# Patient Record
Sex: FEMALE | Race: OTHER | Employment: UNEMPLOYED | URBAN - METROPOLITAN AREA
[De-identification: names, ages, dates, MRNs, and addresses within clinical notes are randomized per-mention and may not be internally consistent; named-entity substitution may affect disease eponyms.]

---

## 2024-11-08 ENCOUNTER — APPOINTMENT (EMERGENCY)
Dept: ULTRASOUND IMAGING | Facility: HOSPITAL | Age: 29
End: 2024-11-08

## 2024-11-08 ENCOUNTER — HOSPITAL ENCOUNTER (EMERGENCY)
Facility: HOSPITAL | Age: 29
Discharge: HOME | End: 2024-11-08
Attending: EMERGENCY MEDICINE

## 2024-11-08 VITALS
HEIGHT: 64 IN | DIASTOLIC BLOOD PRESSURE: 68 MMHG | WEIGHT: 146 LBS | TEMPERATURE: 98.5 F | SYSTOLIC BLOOD PRESSURE: 106 MMHG | BODY MASS INDEX: 24.92 KG/M2 | OXYGEN SATURATION: 100 % | RESPIRATION RATE: 18 BRPM | HEART RATE: 60 BPM

## 2024-11-08 DIAGNOSIS — N83.8 ENLARGED OVARY: Primary | ICD-10-CM

## 2024-11-08 DIAGNOSIS — R10.2 PELVIC PAIN IN FEMALE: ICD-10-CM

## 2024-11-08 LAB
ALBUMIN SERPL BCG-MCNC: 4.2 G/DL (ref 3.5–5)
ALP SERPL-CCNC: 49 U/L (ref 34–104)
ALT SERPL W P-5'-P-CCNC: 14 U/L (ref 7–52)
ANION GAP SERPL CALCULATED.3IONS-SCNC: 6 MMOL/L (ref 4–13)
AST SERPL W P-5'-P-CCNC: 15 U/L (ref 13–39)
BASOPHILS # BLD AUTO: 0.03 THOUSANDS/ÂΜL (ref 0–0.1)
BASOPHILS NFR BLD AUTO: 0 % (ref 0–1)
BILIRUB SERPL-MCNC: 0.21 MG/DL (ref 0.2–1)
BUN SERPL-MCNC: 30 MG/DL (ref 5–25)
CALCIUM SERPL-MCNC: 9.2 MG/DL (ref 8.4–10.2)
CHLORIDE SERPL-SCNC: 105 MMOL/L (ref 96–108)
CO2 SERPL-SCNC: 26 MMOL/L (ref 21–32)
CREAT SERPL-MCNC: 1.07 MG/DL (ref 0.6–1.3)
EOSINOPHIL # BLD AUTO: 0.09 THOUSAND/ÂΜL (ref 0–0.61)
EOSINOPHIL NFR BLD AUTO: 1 % (ref 0–6)
ERYTHROCYTE [DISTWIDTH] IN BLOOD BY AUTOMATED COUNT: 11.9 % (ref 11.6–15.1)
GFR SERPL CREATININE-BSD FRML MDRD: 69 ML/MIN/1.73SQ M
GLUCOSE SERPL-MCNC: 84 MG/DL (ref 65–140)
HCG SERPL QL: NEGATIVE
HCT VFR BLD AUTO: 37.9 % (ref 34.8–46.1)
HGB BLD-MCNC: 12.5 G/DL (ref 11.5–15.4)
IMM GRANULOCYTES # BLD AUTO: 0.01 THOUSAND/UL (ref 0–0.2)
IMM GRANULOCYTES NFR BLD AUTO: 0 % (ref 0–2)
LYMPHOCYTES # BLD AUTO: 3.03 THOUSANDS/ÂΜL (ref 0.6–4.47)
LYMPHOCYTES NFR BLD AUTO: 43 % (ref 14–44)
MCH RBC QN AUTO: 30.6 PG (ref 26.8–34.3)
MCHC RBC AUTO-ENTMCNC: 33 G/DL (ref 31.4–37.4)
MCV RBC AUTO: 93 FL (ref 82–98)
MONOCYTES # BLD AUTO: 0.53 THOUSAND/ÂΜL (ref 0.17–1.22)
MONOCYTES NFR BLD AUTO: 8 % (ref 4–12)
NEUTROPHILS # BLD AUTO: 3.3 THOUSANDS/ÂΜL (ref 1.85–7.62)
NEUTS SEG NFR BLD AUTO: 48 % (ref 43–75)
NRBC BLD AUTO-RTO: 0 /100 WBCS
PLATELET # BLD AUTO: 264 THOUSANDS/UL (ref 149–390)
PMV BLD AUTO: 9.8 FL (ref 8.9–12.7)
POTASSIUM SERPL-SCNC: 4.2 MMOL/L (ref 3.5–5.3)
PROT SERPL-MCNC: 6.4 G/DL (ref 6.4–8.4)
RBC # BLD AUTO: 4.08 MILLION/UL (ref 3.81–5.12)
SODIUM SERPL-SCNC: 137 MMOL/L (ref 135–147)
WBC # BLD AUTO: 6.99 THOUSAND/UL (ref 4.31–10.16)

## 2024-11-08 PROCEDURE — 80053 COMPREHEN METABOLIC PANEL: CPT | Performed by: EMERGENCY MEDICINE

## 2024-11-08 PROCEDURE — 96374 THER/PROPH/DIAG INJ IV PUSH: CPT

## 2024-11-08 PROCEDURE — 99284 EMERGENCY DEPT VISIT MOD MDM: CPT | Performed by: EMERGENCY MEDICINE

## 2024-11-08 PROCEDURE — 76830 TRANSVAGINAL US NON-OB: CPT

## 2024-11-08 PROCEDURE — 85025 COMPLETE CBC W/AUTO DIFF WBC: CPT | Performed by: EMERGENCY MEDICINE

## 2024-11-08 PROCEDURE — 84703 CHORIONIC GONADOTROPIN ASSAY: CPT | Performed by: EMERGENCY MEDICINE

## 2024-11-08 PROCEDURE — 96375 TX/PRO/DX INJ NEW DRUG ADDON: CPT

## 2024-11-08 PROCEDURE — 99284 EMERGENCY DEPT VISIT MOD MDM: CPT

## 2024-11-08 PROCEDURE — 36415 COLL VENOUS BLD VENIPUNCTURE: CPT | Performed by: EMERGENCY MEDICINE

## 2024-11-08 PROCEDURE — 76856 US EXAM PELVIC COMPLETE: CPT

## 2024-11-08 RX ORDER — FENTANYL CITRATE 50 UG/ML
50 INJECTION, SOLUTION INTRAMUSCULAR; INTRAVENOUS ONCE
Status: COMPLETED | OUTPATIENT
Start: 2024-11-08 | End: 2024-11-08

## 2024-11-08 RX ORDER — KETOROLAC TROMETHAMINE 30 MG/ML
15 INJECTION, SOLUTION INTRAMUSCULAR; INTRAVENOUS ONCE
Status: COMPLETED | OUTPATIENT
Start: 2024-11-08 | End: 2024-11-08

## 2024-11-08 RX ADMIN — KETOROLAC TROMETHAMINE 15 MG: 30 INJECTION, SOLUTION INTRAMUSCULAR at 21:49

## 2024-11-08 RX ADMIN — FENTANYL CITRATE 50 MCG: 50 INJECTION INTRAMUSCULAR; INTRAVENOUS at 20:03

## 2024-11-09 NOTE — ED PROVIDER NOTES
Time reflects when diagnosis was documented in both MDM as applicable and the Disposition within this note       Time User Action Codes Description Comment    11/8/2024 10:07 PM Celeste Mary [N83.8] Enlarged ovary     11/8/2024 10:08 PM Celeste Mary [R10.2] Pelvic pain in female           ED Disposition       ED Disposition   Discharge    Condition   Stable    Date/Time   Fri Nov 8, 2024 10:08 PM    Comment   Aleta Griffith discharge to home/self care.                   Assessment & Plan       Medical Decision Making  29-year-old female presents for evaluation with right-sided pelvic pain on and off for the past 1 week.  On exam, patient with normal vitals, no acute distress, but patient does appear uncomfortable.  Patient has some tenderness in the right lower quadrant of the abdomen without rebound tenderness.  Concern for possible ovarian cysts versus ovarian torsion versus ectopic pregnancy versus TOA, or other acute intra-abdominal pathology.  Patient given IV analgesia, evaluated with CBC, CMP, urine preg, and pelvic ultrasound.    Urine pregnancy test negative at this time.  Lab workup overall unremarkable.  Ultrasound of the pelvis showed a heterogenous enlarged right ovary with normal Doppler flow.  Spoke with on-call gynecologist who states that the enlarged ovary may be the cause of patient's symptoms.  She does not suspect intermittent ovarian torsion at this time given that the patient has not had any fevers or vomiting.  Patient was given a referral for OB/GYN for outpatient follow-up.  Patient given symptomatic care instructions, discharged home in stable condition with strict ED return precautions.    Amount and/or Complexity of Data Reviewed  Labs: ordered.  Radiology: ordered.    Risk  Prescription drug management.             Medications   fentaNYL injection 50 mcg (50 mcg Intravenous Given 11/8/24 2003)   ketorolac (TORADOL) injection 15 mg (15 mg Intravenous Given 11/8/24 2149)       ED  Risk Strat Scores                           SBIRT 20yo+      Flowsheet Row Most Recent Value   Initial Alcohol Screen: US AUDIT-C     1. How often do you have a drink containing alcohol? 0 Filed at: 11/08/2024 1923   2. How many drinks containing alcohol do you have on a typical day you are drinking?  0 Filed at: 11/08/2024 1923   3b. FEMALE Any Age, or MALE 65+: How often do you have 4 or more drinks on one occassion? 0 Filed at: 11/08/2024 1923   Audit-C Score 0 Filed at: 11/08/2024 1923   GEN: How many times in the past year have you...    Used an illegal drug or used a prescription medication for non-medical reasons? Never Filed at: 11/08/2024 1923                            History of Present Illness       Chief Complaint   Patient presents with    Abdominal Pain     Patient co right abdominal pain that started last Thursday. Patient reports hx ovarian cyst. Patient reports she did recently had her IUD removed. + nausea.        Past Medical History:   Diagnosis Date    Ovarian hyperstimulation syndrome       History reviewed. No pertinent surgical history.   History reviewed. No pertinent family history.   Social History     Tobacco Use    Smoking status: Never    Smokeless tobacco: Never   Vaping Use    Vaping status: Never Used   Substance Use Topics    Alcohol use: Not Currently    Drug use: Not Currently      E-Cigarette/Vaping    E-Cigarette Use Never User       E-Cigarette/Vaping Substances      I have reviewed and agree with the history as documented.     29-year-old female presents for evaluation with right sided pelvic pain.  Patient states that pain began approximately 1 week ago, but has been intermittent since then.  She believes that she may have ovarian cysts, and states that she has had several times where it feels as though a cyst has ruptured.  She states that she did have a history of ovarian cysts years ago, but she has not had any issues with them since being on birth control.  However,  approximately 1 month ago, patient had her IUD removed.  She denies any associated fevers.  She states that she has had some nausea, but denies any episodes of vomiting.  Patient states that she is still eating normally.  She denies any vaginal bleeding or discharge.  Denies any other concerning symptoms at this time.        Review of Systems   Constitutional:  Negative for fever.   Cardiovascular:  Negative for chest pain.   Gastrointestinal:  Positive for abdominal pain. Negative for vomiting.   Genitourinary:  Positive for pelvic pain. Negative for vaginal bleeding and vaginal discharge.   All other systems reviewed and are negative.          Objective       ED Triage Vitals   Temperature Pulse Blood Pressure Respirations SpO2 Patient Position - Orthostatic VS   11/08/24 1920 11/08/24 1920 11/08/24 1920 11/08/24 1920 11/08/24 1920 11/08/24 1920   98.5 °F (36.9 °C) 74 121/72 15 100 % Sitting      Temp Source Heart Rate Source BP Location FiO2 (%) Pain Score    11/08/24 1920 11/08/24 1920 11/08/24 1920 -- 11/08/24 2003    Oral Monitor Left arm  9      Vitals      Date and Time Temp Pulse SpO2 Resp BP Pain Score FACES Pain Rating User   11/08/24 2215 -- 60 100 % 18 106/68 -- -- [place]   11/08/24 2152 -- 60 99 % 16 89/54 6 -- [place]   11/08/24 2149 -- -- -- -- -- 6 -- [place]   11/08/24 2003 -- -- -- -- -- 9 -- [place]   11/08/24 1920 98.5 °F (36.9 °C) 74 100 % 15 121/72 -- -- [place]            Physical Exam  Vitals and nursing note reviewed.   Constitutional:       General: She is awake. She is not in acute distress.     Appearance: She is not toxic-appearing.   HENT:      Head: Normocephalic and atraumatic.   Eyes:      General: Vision grossly intact. Gaze aligned appropriately.   Cardiovascular:      Rate and Rhythm: Normal rate and regular rhythm.   Pulmonary:      Effort: Pulmonary effort is normal. No respiratory distress.   Abdominal:      General: There is no distension.      Palpations: Abdomen is soft.      Tenderness: There is  abdominal tenderness in the right lower quadrant. There is no rebound.   Musculoskeletal:      Cervical back: Full passive range of motion without pain and neck supple.   Skin:     General: Skin is warm and dry.   Neurological:      General: No focal deficit present.      Mental Status: She is alert and oriented to person, place, and time.         Results Reviewed       Procedure Component Value Units Date/Time    hCG, qualitative pregnancy [[id number]]  (Normal) Collected: 11/08/24 2003    Lab Status: Final result Specimen: Blood from Arm, Right Updated: 11/08/24 2037     Preg, Serum Negative    Comprehensive metabolic panel [[id number]]  (Abnormal) Collected: 11/08/24 2003    Lab Status: Final result Specimen: Blood from Arm, Right Updated: 11/08/24 2031     Sodium 137 mmol/L      Potassium 4.2 mmol/L      Chloride 105 mmol/L      CO2 26 mmol/L      ANION GAP 6 mmol/L      BUN 30 mg/dL      Creatinine 1.07 mg/dL      Glucose 84 mg/dL      Calcium 9.2 mg/dL      AST 15 U/L      ALT 14 U/L      Alkaline Phosphatase 49 U/L      Total Protein 6.4 g/dL      Albumin 4.2 g/dL      Total Bilirubin 0.21 mg/dL      eGFR 69 ml/min/1.73sq m     Narrative:      National Kidney Disease Foundation guidelines for Chronic Kidney Disease (CKD):     Stage 1 with normal or high GFR (GFR > 90 mL/min/1.73 square meters)    Stage 2 Mild CKD (GFR = 60-89 mL/min/1.73 square meters)    Stage 3A Moderate CKD (GFR = 45-59 mL/min/1.73 square meters)    Stage 3B Moderate CKD (GFR = 30-44 mL/min/1.73 square meters)    Stage 4 Severe CKD (GFR = 15-29 mL/min/1.73 square meters)    Stage 5 End Stage CKD (GFR <15 mL/min/1.73 square meters)  Note: GFR calculation is accurate only with a steady state creatinine    CBC and differential [[id number]] Collected: 11/08/24 2003    Lab Status: Final result Specimen: Blood from Arm, Right Updated: 11/08/24 2011     WBC 6.99 Thousand/uL      RBC 4.08 Million/uL      Hemoglobin 12.5 g/dL      Hematocrit 37.9 %       MCV 93 fL      MCH 30.6 pg      MCHC 33.0 g/dL      RDW 11.9 %      MPV 9.8 fL      Platelets 264 Thousands/uL      nRBC 0 /100 WBCs      Segmented % 48 %      Immature Grans % 0 %      Lymphocytes % 43 %      Monocytes % 8 %      Eosinophils Relative 1 %      Basophils Relative 0 %      Absolute Neutrophils 3.30 Thousands/µL      Absolute Immature Grans 0.01 Thousand/uL      Absolute Lymphocytes 3.03 Thousands/µL      Absolute Monocytes 0.53 Thousand/µL      Eosinophils Absolute 0.09 Thousand/µL      Basophils Absolute 0.03 Thousands/µL             US pelvis complete w transvaginal   Final Interpretation by Juan Samuels DO (11/08 2115)      Heterogeneous enlarged right ovary. Doppler flow is currently present within the right ovary. Findings could represent intermittent torsion or a nonspecific inflammatory/reactive process. However, no evidence of torsion at the current time. Continued    clinical follow-up recommended.                  The study was marked in EPIC for immediate notification.         Workstation performed: [id number]             Procedures    ED Medication and Procedure Management   None     There are no discharge medications for this patient.      ED SEPSIS DOCUMENTATION   Time reflects when diagnosis was documented in both MDM as applicable and the Disposition within this note       Time User Action Codes Description Comment    11/8/2024 10:07 PM Celeste Mary [N83.8] Enlarged ovary     11/8/2024 10:08 PM Celeste Mary [R10.2] Pelvic pain in female                  Celeste Mary DO  11/09/24 9016

## 2024-12-28 NOTE — PROGRESS NOTES
Name: Aleta Griffith      : 1995      MRN: [id number]  Encounter Provider: Ai Velazquez PA-C  Encounter Date: 2024   Encounter department: [facility] [place]  :  Assessment & Plan  Enlarged ovary  -Patient seen in ER 24 for right-sided pelvic pain. Pelvic US showed enlarged right ovary with low suspicion for torsion or TOA given patient was not having fever/chills. Pelvic pain resided 3 days after visit to the ER. Hx of ovarian hyperstimulation syndrome  -Repeat US ordered to check on right ovary  -Discussed possible contraception in the future, patient is not interested in this time   -Go to the ER if pelvic pain returns, you experience any fever, chills, nausea, or vomiting  Orders:    Ambulatory Referral to Obstetrics / Gynecology    US pelvis complete w transvaginal; Future    Hirsutism  -Patient reports hirsutism and cycles that are 30-31 days.   -PCOS labs ordered to confirm diagnosis and rule out other causes for symptoms.   -Patient requested other labs to be ordered such as cortisol, FSH, and LH. Discussed that those labs would not be appropriate at this time   Orders:    Testosterone, free, total; Future    DHEA-sulfate; Future    17-Hydroxyprogesterone; Future    Prolactin; Future    Hemoglobin A1C; Future    TSH, 3rd generation with Free T4 reflex; Future      History of Present Illness   HPI  Aleta Griffith is a 29 y.o. female with a PMH of ovarian hyperstimulation syndrome who presents for a follow-up from the ER for right-sided pelvic pain on 24. The patient stated the pelvic US showed a right enlarged ovary, and had low suspicion for ovarian torsion or TOA given that she was not having any fever or chills. She reports that the symptoms went away 3 days after her visit to the ER. She currently denies any pelvic pain, fever, chills, nausea, vomiting, urinary symptoms, vaginal bleeding, abdominal pain, or vaginal discharge. LMP:  12/19/24. States that she was on contraception for 12 years, and prior to that her cycles were irregular. She recently had her IUD pulled in 11/2024 and her periods have been every 30-31 days since then. She states that they usually last for 6-7 days and she changes her pads twice a day. Denies amenorrhea. LMP: 12/19/24. She reports symptoms of hyperandrogenism such as male-patterned hair growth, and has gotten laser hair removal for it in the past. She states that she has donated her eggs and was diagnosed with ovarian hyperstimulation disorder. Reports that her last donation was 5/2023. She denies using any contraception currently. Patient has not yet had follow-up US for enlarged ovary. Patient states that she is a .     Review of Systems   Constitutional:  Negative for chills and fever.   Gastrointestinal:  Negative for abdominal pain and diarrhea.   Genitourinary:  Negative for decreased urine volume, dysuria, frequency, pelvic pain, urgency, vaginal bleeding, vaginal discharge and vaginal pain.     Pertinent Medical History   Past Medical History:   Diagnosis Date    Ovarian hyperstimulation syndrome     Polycystic ovary syndrome             Current Outpatient Medications on File Prior to Visit   Medication Sig Dispense Refill    Magnesium Oxide 140 MG CAPS Take 200 mg by mouth 3 (three) times a day      Probiotic Product (PROBIOTIC DAILY PO) Take 1 capsule by mouth in the morning       No current facility-administered medications on file prior to visit.      Social History     Tobacco Use    Smoking status: Never    Smokeless tobacco: Never   Vaping Use    Vaping status: Never Used   Substance and Sexual Activity    Alcohol use: Yes     Alcohol/week: 3.0 standard drinks of alcohol     Types: 3 Shots of liquor per week    Drug use: Yes     Frequency: 1.0 times per week     Types: Marijuana    Sexual activity: Yes     Partners: Male     Birth control/protection: None        Objective   /78 (BP  Location: Left arm, Patient Position: Sitting, Cuff Size: Standard)   Wt 66.5 kg (146 lb 9.6 oz)   LMP 12/19/2024 (Exact Date)   BMI 25.16 kg/m²      Physical Exam  Constitutional:       General: She is not in acute distress.     Appearance: Normal appearance. She is not ill-appearing.   Genitourinary:      Bladder normal.      No vaginal tenderness.        Right Adnexa: not tender, not full and no mass present.     Left Adnexa: not tender, not full and no mass present.     No cervical motion tenderness.      Uterus is not enlarged, fixed or tender.      No uterine mass detected.  HENT:      Head: Normocephalic and atraumatic.   Pulmonary:      Effort: Pulmonary effort is normal.   Abdominal:      General: Abdomen is flat.   Neurological:      Mental Status: She is alert.   Psychiatric:         Mood and Affect: Mood normal.         Behavior: Behavior normal.         Thought Content: Thought content normal.   Vitals reviewed.

## 2024-12-30 ENCOUNTER — APPOINTMENT (OUTPATIENT)
Age: 29
End: 2024-12-30
Payer: COMMERCIAL

## 2024-12-30 ENCOUNTER — CONSULT (OUTPATIENT)
Dept: OBGYN CLINIC | Facility: CLINIC | Age: 29
End: 2024-12-30
Payer: COMMERCIAL

## 2024-12-30 VITALS — BODY MASS INDEX: 25.16 KG/M2 | SYSTOLIC BLOOD PRESSURE: 100 MMHG | WEIGHT: 146.6 LBS | DIASTOLIC BLOOD PRESSURE: 78 MMHG

## 2024-12-30 DIAGNOSIS — L68.0 HIRSUTISM: ICD-10-CM

## 2024-12-30 DIAGNOSIS — R10.2 PELVIC PAIN IN FEMALE: ICD-10-CM

## 2024-12-30 DIAGNOSIS — N83.8 ENLARGED OVARY: Primary | ICD-10-CM

## 2024-12-30 LAB
EST. AVERAGE GLUCOSE BLD GHB EST-MCNC: 120 MG/DL
HBA1C MFR BLD: 5.8 %
PROLACTIN SERPL-MCNC: 7.93 NG/ML (ref 3.34–26.72)
TSH SERPL DL<=0.05 MIU/L-ACNC: 1.77 UIU/ML (ref 0.45–4.5)

## 2024-12-30 PROCEDURE — 82627 DEHYDROEPIANDROSTERONE: CPT

## 2024-12-30 PROCEDURE — 83498 ASY HYDROXYPROGESTERONE 17-D: CPT

## 2024-12-30 PROCEDURE — 36415 COLL VENOUS BLD VENIPUNCTURE: CPT

## 2024-12-30 PROCEDURE — 99204 OFFICE O/P NEW MOD 45 MIN: CPT

## 2024-12-30 PROCEDURE — 83036 HEMOGLOBIN GLYCOSYLATED A1C: CPT

## 2024-12-30 PROCEDURE — 84146 ASSAY OF PROLACTIN: CPT

## 2024-12-30 PROCEDURE — 84402 ASSAY OF FREE TESTOSTERONE: CPT

## 2024-12-30 PROCEDURE — 84403 ASSAY OF TOTAL TESTOSTERONE: CPT

## 2024-12-30 PROCEDURE — 84443 ASSAY THYROID STIM HORMONE: CPT

## 2024-12-31 ENCOUNTER — RESULTS FOLLOW-UP (OUTPATIENT)
Dept: OBGYN CLINIC | Facility: CLINIC | Age: 29
End: 2024-12-31

## 2025-01-01 LAB
DHEA-S SERPL-MCNC: 190 UG/DL (ref 84.8–378)
TESTOST FREE SERPL-MCNC: 4.8 PG/ML (ref 0–4.2)
TESTOST SERPL-MCNC: 33 NG/DL (ref 13–71)

## 2025-01-02 ENCOUNTER — APPOINTMENT (OUTPATIENT)
Age: 30
End: 2025-01-02
Payer: COMMERCIAL

## 2025-01-02 DIAGNOSIS — L68.0 HIRSUTISM: ICD-10-CM

## 2025-01-02 LAB
ESTRADIOL SERPL-MCNC: 127.9 PG/ML
FSH SERPL-ACNC: 2.5 MIU/ML
LH SERPL-ACNC: 5.9 MIU/ML

## 2025-01-02 PROCEDURE — 36415 COLL VENOUS BLD VENIPUNCTURE: CPT

## 2025-01-02 PROCEDURE — 83002 ASSAY OF GONADOTROPIN (LH): CPT

## 2025-01-02 PROCEDURE — 82670 ASSAY OF TOTAL ESTRADIOL: CPT

## 2025-01-02 PROCEDURE — 83001 ASSAY OF GONADOTROPIN (FSH): CPT

## 2025-01-03 LAB — 17OHP SERPL-MCNC: 63 NG/DL

## 2025-01-16 ENCOUNTER — HOSPITAL ENCOUNTER (OUTPATIENT)
Dept: ULTRASOUND IMAGING | Facility: HOSPITAL | Age: 30
End: 2025-01-16
Payer: COMMERCIAL

## 2025-01-16 DIAGNOSIS — N83.8 ENLARGED OVARY: ICD-10-CM

## 2025-01-16 PROCEDURE — 76830 TRANSVAGINAL US NON-OB: CPT

## 2025-01-16 PROCEDURE — 76856 US EXAM PELVIC COMPLETE: CPT

## 2025-01-17 ENCOUNTER — OFFICE VISIT (OUTPATIENT)
Dept: FAMILY MEDICINE CLINIC | Facility: CLINIC | Age: 30
End: 2025-01-17
Payer: COMMERCIAL

## 2025-01-17 VITALS
HEIGHT: 64 IN | SYSTOLIC BLOOD PRESSURE: 102 MMHG | BODY MASS INDEX: 25.27 KG/M2 | DIASTOLIC BLOOD PRESSURE: 62 MMHG | OXYGEN SATURATION: 98 % | HEART RATE: 80 BPM | WEIGHT: 148 LBS

## 2025-01-17 DIAGNOSIS — Z00.00 ANNUAL PHYSICAL EXAM: Primary | ICD-10-CM

## 2025-01-17 DIAGNOSIS — E28.2 PCOS (POLYCYSTIC OVARIAN SYNDROME): ICD-10-CM

## 2025-01-17 DIAGNOSIS — R73.03 PREDIABETES: ICD-10-CM

## 2025-01-17 DIAGNOSIS — Z12.4 ENCOUNTER FOR PAPANICOLAOU SMEAR FOR CERVICAL CANCER SCREENING: ICD-10-CM

## 2025-01-17 DIAGNOSIS — Z83.79 FAMILY HISTORY OF COLITIS: ICD-10-CM

## 2025-01-17 PROCEDURE — 99385 PREV VISIT NEW AGE 18-39: CPT | Performed by: FAMILY MEDICINE

## 2025-01-17 NOTE — PATIENT INSTRUCTIONS
"Patient Education     Routine physical for adults   The Basics   Written by the doctors and editors at [facility]   What is a physical? -- A physical is a routine visit, or \"check-up,\" with your doctor. You might also hear it called a \"wellness visit\" or \"preventive visit.\"  During each visit, the doctor will:   Ask about your physical and mental health   Ask about your habits, behaviors, and lifestyle   Do an exam   Give you vaccines if needed   Talk to you about any medicines you take   Give advice about your health   Answer your questions  Getting regular check-ups is an important part of taking care of your health. It can help your doctor find and treat any problems you have. But it's also important for preventing health problems.  A routine physical is different from a \"sick visit.\" A sick visit is when you see a doctor because of a health concern or problem. Since physicals are scheduled ahead of time, you can think about what you want to ask the doctor.  How often should I get a physical? -- It depends on your age and health. In general, for people age 21 years and older:   If you are younger than 50 years, you might be able to get a physical every 3 years.   If you are 50 years or older, your doctor might recommend a physical every year.  If you have an ongoing health condition, like diabetes or high blood pressure, your doctor will probably want to see you more often.  What happens during a physical? -- In general, each visit will include:   Physical exam - The doctor or nurse will check your height, weight, heart rate, and blood pressure. They will also look at your eyes and ears. They will ask about how you are feeling and whether you have any symptoms that bother you.   Medicines - It's a good idea to bring a list of all the medicines you take to each doctor visit. Your doctor will talk to you about your medicines and answer any questions. Tell them if you are having any side effects that bother you. You " "should also tell them if you are having trouble paying for any of your medicines.   Habits and behaviors - This includes:   Your diet   Your exercise habits   Whether you smoke, drink alcohol, or use drugs   Whether you are sexually active   Whether you feel safe at home  Your doctor will talk to you about things you can do to improve your health and lower your risk of health problems. They will also offer help and support. For example, if you want to quit smoking, they can give you advice and might prescribe medicines. If you want to improve your diet or get more physical activity, they can help you with this, too.   Lab tests, if needed - The tests you get will depend on your age and situation. For example, your doctor might want to check your:   Cholesterol   Blood sugar   Iron level   Vaccines - The recommended vaccines will depend on your age, health, and what vaccines you already had. Vaccines are very important because they can prevent certain serious or deadly infections.   Discussion of screening - \"Screening\" means checking for diseases or other health problems before they cause symptoms. Your doctor can recommend screening based on your age, risk, and preferences. This might include tests to check for:   Cancer, such as breast, prostate, cervical, ovarian, colorectal, prostate, lung, or skin cancer   Sexually transmitted infections, such as chlamydia and gonorrhea   Mental health conditions like depression and anxiety  Your doctor will talk to you about the different types of screening tests. They can help you decide which screenings to have. They can also explain what the results might mean.   Answering questions - The physical is a good time to ask the doctor or nurse questions about your health. If needed, they can refer you to other doctors or specialists, too.  Adults older than 65 years often need other care, too. As you get older, your doctor will talk to you about:   How to prevent falling at " home   Hearing or vision tests   Memory testing   How to take your medicines safely   Making sure that you have the help and support you need at home  All topics are updated as new evidence becomes available and our peer review process is complete.  This topic retrieved from [employer] on: May 02, 2024.  Topic 580515 Version 1.0  Release: 32.4.3 - C32.122  © 2024 UpToDate, Inc. and/or its affiliates. All rights reserved.  Consumer Information Use and Disclaimer   Disclaimer: This generalized information is a limited summary of diagnosis, treatment, and/or medication information. It is not meant to be comprehensive and should be used as a tool to help the user understand and/or assess potential diagnostic and treatment options. It does NOT include all information about conditions, treatments, medications, side effects, or risks that may apply to a specific patient. It is not intended to be medical advice or a substitute for the medical advice, diagnosis, or treatment of a health care provider based on the health care provider's examination and assessment of a patient's specific and unique circumstances. Patients must speak with a health care provider for complete information about their health, medical questions, and treatment options, including any risks or benefits regarding use of medications. This information does not endorse any treatments or medications as safe, effective, or approved for treating a specific patient. UpToDate, Inc. and its affiliates disclaim any warranty or liability relating to this information or the use thereof.The use of this information is governed by the Terms of Use, available at https://www.woltersUanbaiuwer.com/en/know/clinical-effectiveness-terms. 2024© UpToDate, Inc. and its affiliates and/or licensors. All rights reserved.  Copyright   © 2024 UpToDate, Inc. and/or its affiliates. All rights reserved.

## 2025-01-17 NOTE — ASSESSMENT & PLAN NOTE
Orders:    Ambulatory Referral to Obstetrics / Gynecology; Future    TSH, 3rd generation; Future    T4, free; Future    T3, free; Future    Cortisol Level, AM Specimen; Future

## 2025-01-17 NOTE — PROGRESS NOTES
Adult Annual Physical  Name: Aleta Griffith      : 1995      MRN: [id number]  Encounter Provider: Cecelia Fernandez DO  Encounter Date: 2025   Encounter department: [facility] [address]    Assessment & Plan  Annual physical exam         BMI 25.0-25.9,adult         PCOS (polycystic ovarian syndrome)    Orders:    Ambulatory Referral to Obstetrics / Gynecology; Future    TSH, 3rd generation; Future    T4, free; Future    T3, free; Future    Cortisol Level, AM Specimen; Future    Encounter for Papanicolaou smear for cervical cancer screening    Orders:    Ambulatory Referral to Obstetrics / Gynecology; Future    Prediabetes  Hgb A1c of 5.8. Has been taking Berberine.   Orders:    TSH, 3rd generation; Future    T4, free; Future    T3, free; Future    Cortisol Level, AM Specimen; Future    Family history of colitis    Orders:    Ambulatory Referral to Gastroenterology; Future      Immunizations and preventive care screenings were discussed with patient today. Appropriate education was printed on patient's after visit summary.    Counseling:  Alcohol/drug use: discussed moderation in alcohol intake, the recommendations for healthy alcohol use, and avoidance of illicit drug use.  Dental Health: discussed importance of regular tooth brushing, flossing, and dental visits.  Sexual health: discussed sexually transmitted diseases, partner selection, use of condoms, avoidance of unintended pregnancy, and contraceptive alternatives.  Exercise: the importance of regular exercise/physical activity was discussed. Recommend exercise 3-5 times per week for at least 30 minutes.      History of Present Illness     Adult Annual Physical:  Patient presents for annual physical. Notes that she has a history of PCOS, prediabetes on labs from 24.    Requesting GI referral, family history of IBS (mother) and colitis (brother)..     Diet and Physical Activity:  - Diet/Nutrition: well  "balanced diet.  - Exercise: strength training exercises, moderate cardiovascular exercise and 5-7 times a week on average.    Depression Screening:  - PHQ-2 Score: 0    General Health:  - Sleep: sleeps well.  - Hearing: normal hearing bilateral ears.  - Vision: no vision problems, most recent eye exam > 1 year ago and previous LASIK surgery.  - Dental: regular dental visits, brushes teeth once daily and does not floss.    /GYN Health:  - Follows with GYN: no.   - Menopause: premenopausal.   - Last menstrual cycle: 12/19/2024.   - Contraception:. none      Advanced Care Planning:  - Has an advanced directive?: no    - Has a durable medical POA?: no      Review of Systems    Objective   /62 (Patient Position: Sitting, Cuff Size: Standard)   Pulse 80   Ht 5' 4\" (1.626 m)   Wt 67.1 kg (148 lb)   LMP 12/19/2024 (Exact Date)   SpO2 98%   BMI 25.40 kg/m²     Physical Exam  Vitals reviewed.   Constitutional:       General: She is not in acute distress.     Appearance: Normal appearance.   HENT:      Head: Normocephalic and atraumatic.      Right Ear: External ear normal.      Left Ear: External ear normal.      Nose: Nose normal.      Mouth/Throat:      Mouth: Mucous membranes are moist.   Eyes:      Extraocular Movements: Extraocular movements intact.      Conjunctiva/sclera: Conjunctivae normal.      Pupils: Pupils are equal, round, and reactive to light.   Cardiovascular:      Rate and Rhythm: Normal rate and regular rhythm.      Heart sounds: Normal heart sounds.   Pulmonary:      Effort: Pulmonary effort is normal.      Breath sounds: Normal breath sounds.   Abdominal:      General: Bowel sounds are normal. There is no distension.      Palpations: Abdomen is soft.      Tenderness: There is no abdominal tenderness.   Musculoskeletal:      Cervical back: Neck supple.      Right lower leg: No edema.      Left lower leg: No edema.   Lymphadenopathy:      Cervical: No cervical adenopathy.   Skin:     General: " Skin is warm.      Capillary Refill: Capillary refill takes less than 2 seconds.      Findings: No rash.   Neurological:      Mental Status: She is alert. Mental status is at baseline.       DO Aiden Mclean [facility]  1/17/2025 2:19 PM

## 2025-01-23 ENCOUNTER — OFFICE VISIT (OUTPATIENT)
Dept: GASTROENTEROLOGY | Facility: CLINIC | Age: 30
End: 2025-01-23
Payer: COMMERCIAL

## 2025-01-23 VITALS
HEIGHT: 64 IN | BODY MASS INDEX: 24.59 KG/M2 | TEMPERATURE: 97.8 F | WEIGHT: 144 LBS | OXYGEN SATURATION: 98 % | HEART RATE: 74 BPM | SYSTOLIC BLOOD PRESSURE: 100 MMHG | DIASTOLIC BLOOD PRESSURE: 66 MMHG

## 2025-01-23 DIAGNOSIS — R19.4 CHANGE IN BOWEL HABITS: ICD-10-CM

## 2025-01-23 DIAGNOSIS — R10.13 EPIGASTRIC PAIN: ICD-10-CM

## 2025-01-23 DIAGNOSIS — Z83.79 FAMILY HISTORY OF INFLAMMATORY BOWEL DISEASE: ICD-10-CM

## 2025-01-23 DIAGNOSIS — R19.5 ELEVATED FECAL CALPROTECTIN: Primary | ICD-10-CM

## 2025-01-23 DIAGNOSIS — K21.9 GASTROESOPHAGEAL REFLUX DISEASE, UNSPECIFIED WHETHER ESOPHAGITIS PRESENT: ICD-10-CM

## 2025-01-23 DIAGNOSIS — K59.09 CHRONIC CONSTIPATION: ICD-10-CM

## 2025-01-23 PROCEDURE — 99203 OFFICE O/P NEW LOW 30 MIN: CPT | Performed by: PHYSICIAN ASSISTANT

## 2025-01-23 RX ORDER — CALCIUM CARBONATE/VITAMIN D3 600MG-62.5
600 CAPSULE ORAL 2 TIMES DAILY
COMMUNITY

## 2025-01-23 RX ORDER — SACCHAROMYCES BOULARDII 250 MG
250 CAPSULE ORAL 2 TIMES DAILY
COMMUNITY

## 2025-01-23 NOTE — PATIENT INSTRUCTIONS
Scheduled date of EGD(as of today):2/4/25  Physician performing EGD:Glenny  Location of EGD:[place]  Instructions reviewed with patient by:Reema GU  Clearances:  none

## 2025-01-23 NOTE — LETTER
2025     Cecelia Fernandez DO  [address]  [facility] [address]    Patient: Aleta Griffith   YOB: 1995   Date of Visit: 2025       Dear Dr. Fernandez:    Thank you for referring Aleta Griffith to me for evaluation. Below are my notes for this consultation.    If you have questions, please do not hesitate to call me. I look forward to following your patient along with you.         Sincerely,        Janeth Dinero PA-C        CC: No Recipients    Janeth Dinero PA-C  2025 12:53 PM  Signed  Name: Aleta Griffith      : 1995      MRN: [id number]  Encounter Provider: Janeth Dinero PA-C  Encounter Date: 2025   Encounter department: [facility] [place]  :  Assessment & Plan  Family history of inflammatory bowel disease    Orders:  •  Ambulatory Referral to Gastroenterology  •  MRI enterography w wo; Future    Elevated fecal calprotectin  - She presents due to 2 years of change in bowel habits with constipation associated with bloating, early satiety, fullness, with a colonoscopy in 2024 in [place] showing dimunitive hyperplastic polyps in the rectosigmoid and melanosis coli but no evidence of colitis or terminal ileitis   - she did GI mapping which showed an elevated fecal calprotectin >3000  - She had a negative CTA abdomen in 2024  - Will plan for MR enterography to evaluation for small bowel pathology such as small bowel Crohn's  - Plan for EGD to rule out PUD, H. pylori, and celiac disease  Orders:  •  EGD; Future  •  MRI enterography w wo; Future    Change in bowel habits    Orders:  •  EGD; Future    Chronic constipation    Orders:  •  MRI enterography w wo; Future    Epigastric pain  - EGD as above due to her epigastric discomfort with fullness and early satiety at times  Orders:  •  EGD; Future    Gastroesophageal reflux disease, unspecified whether esophagitis present    Orders:  •   EGD; Future        History of Present Illness  HPI  Aleta Griffith is a 29 y.o. female who presents for evaluation primarily of constipation and abnormalities in a GI mapping test she paid for.  She reports that typically at baseline she would have a bowel movement every 2 to 3 days and that was her normal and then about 2 years ago she started to have problems with constipation not going up to 7 to 10 days at a time with associated bloating and discomfort.  She had actually had a colonoscopy done in February of last year in [place] she was living there at the time and they evaluated 5 cm of the terminal ileum which appeared normal and then she was found to have multiple diminutive polyps in the rectosigmoid area, 5 of which were removed and sampled.  Random biopsies were taken throughout the colon.  There is no evidence of colitis or terminal ileitis and the polyps were hyperplastic.  She then had this GI mapping done as recommended through her  as she is a  and she started doing this 2 to 3 years ago.  She is wondering if she could have celiac disease.  She is wondering what could be causing her to be so constipated.  She does not really get any nausea or vomiting but will feel full and like food is just sitting there.  She did get reflux over the past week but this might be related to a berberine supplement she started taking as she has never struggled with chronic reflux before.  She is never had an upper endoscopy in the past.  She does report a family history of maybe inflammatory bowel disease in her brother who told her that he had something similar to her Crohn's disease though does not sound like ulcerative colitis when she described as he is primarily constipated as well.  History obtained from: patient    Review of Systems  Medical History Reviewed by provider this encounter:  Tobacco  Allergies  Meds  Problems  Med Hx  Surg Hx  Fam Hx     .  Current Outpatient Medications on  "File Prior to Visit   Medication Sig Dispense Refill   • Acetylcysteine (N-Acetyl Cysteine) 600 MG CAPS Take 600 mg by mouth 2 (two) times a day     • BERBERINE CHLORIDE PO Take 400 mg by mouth daily     • Bioflavonoid Products (GRAPE SEED EXTRACT PO) Take 125 mg by mouth daily     • Delia, Zingiber officinalis, (DELIA ROOT PO) Take 2,200 mg by mouth 4 times a day     • MAGNESIUM GLUCONATE PO Take by mouth     • Probiotic Product (PROBIOTIC BLEND PO) Take by mouth     • saccharomyces boulardii (FLORASTOR) 250 mg capsule Take 250 mg by mouth 2 (two) times a day       No current facility-administered medications on file prior to visit.         Objective  /66 (BP Location: Left arm, Patient Position: Sitting, Cuff Size: Standard)   Pulse 74   Temp 97.8 °F (36.6 °C) (Tympanic)   Ht 5' 4\" (1.626 m)   Wt 65.3 kg (144 lb)   LMP 12/19/2024 (Exact Date)   SpO2 98%   BMI 24.72 kg/m²      Physical Exam  General- Appears well, no acute distress  CV- RRR, no murmur  Pulm- CTA  bilaterally, no respiratory distress  Abdomen- Non-distended, BS normoactive, NTTP  "

## 2025-01-23 NOTE — PROGRESS NOTES
Name: Aleta Griffith      : 1995      MRN: [id number]  Encounter Provider: Janeth Dinero PA-C  Encounter Date: 2025   Encounter department: [facility] [place]  :  Assessment & Plan  Family history of inflammatory bowel disease    Orders:    Ambulatory Referral to Gastroenterology    MRI enterography w wo; Future    Elevated fecal calprotectin  - She presents due to 2 years of change in bowel habits with constipation associated with bloating, early satiety, fullness, with a colonoscopy in 2024 in [place] showing dimunitive hyperplastic polyps in the rectosigmoid and melanosis coli but no evidence of colitis or terminal ileitis   - she did GI mapping which showed an elevated fecal calprotectin >3000  - She had a negative CTA abdomen in 2024  - Will plan for MR enterography to evaluation for small bowel pathology such as small bowel Crohn's  - Plan for EGD to rule out PUD, H. pylori, and celiac disease  Orders:    EGD; Future    MRI enterography w wo; Future    Change in bowel habits    Orders:    EGD; Future    Chronic constipation    Orders:    MRI enterography w wo; Future    Epigastric pain  - EGD as above due to her epigastric discomfort with fullness and early satiety at times  Orders:    EGD; Future    Gastroesophageal reflux disease, unspecified whether esophagitis present    Orders:    EGD; Future        History of Present Illness   HPI  Aleta Griffith is a 29 y.o. female who presents for evaluation primarily of constipation and abnormalities in a GI mapping test she paid for.  She reports that typically at baseline she would have a bowel movement every 2 to 3 days and that was her normal and then about 2 years ago she started to have problems with constipation not going up to 7 to 10 days at a time with associated bloating and discomfort.  She had actually had a colonoscopy done in February of last year in [place] she was living  there at the time and they evaluated 5 cm of the terminal ileum which appeared normal and then she was found to have multiple diminutive polyps in the rectosigmoid area, 5 of which were removed and sampled.  Random biopsies were taken throughout the colon.  There is no evidence of colitis or terminal ileitis and the polyps were hyperplastic.  She then had this GI mapping done as recommended through her  as she is a  and she started doing this 2 to 3 years ago.  She is wondering if she could have celiac disease.  She is wondering what could be causing her to be so constipated.  She does not really get any nausea or vomiting but will feel full and like food is just sitting there.  She did get reflux over the past week but this might be related to a berberine supplement she started taking as she has never struggled with chronic reflux before.  She is never had an upper endoscopy in the past.  She does report a family history of maybe inflammatory bowel disease in her brother who told her that he had something similar to her Crohn's disease though does not sound like ulcerative colitis when she described as he is primarily constipated as well.  History obtained from: patient    Review of Systems  Medical History Reviewed by provider this encounter:  Tobacco  Allergies  Meds  Problems  Med Hx  Surg Hx  Fam Hx     .  Current Outpatient Medications on File Prior to Visit   Medication Sig Dispense Refill    Acetylcysteine (N-Acetyl Cysteine) 600 MG CAPS Take 600 mg by mouth 2 (two) times a day      BERBERINE CHLORIDE PO Take 400 mg by mouth daily      Bioflavonoid Products (GRAPE SEED EXTRACT PO) Take 125 mg by mouth daily      Delia, Zingiber officinalis, (DELIA ROOT PO) Take 2,200 mg by mouth 4 times a day      MAGNESIUM GLUCONATE PO Take by mouth      Probiotic Product (PROBIOTIC BLEND PO) Take by mouth      saccharomyces boulardii (FLORASTOR) 250 mg capsule Take 250 mg by mouth 2 (two) times a  "day       No current facility-administered medications on file prior to visit.         Objective   /66 (BP Location: Left arm, Patient Position: Sitting, Cuff Size: Standard)   Pulse 74   Temp 97.8 °F (36.6 °C) (Tympanic)   Ht 5' 4\" (1.626 m)   Wt 65.3 kg (144 lb)   LMP 12/19/2024 (Exact Date)   SpO2 98%   BMI 24.72 kg/m²      Physical Exam  General- Appears well, no acute distress  CV- RRR, no murmur  Pulm- CTA  bilaterally, no respiratory distress  Abdomen- Non-distended, BS normoactive, NTTP  "

## 2025-01-23 NOTE — H&P (VIEW-ONLY)
Name: Aleta Griffith      : 1995      MRN: [id number]  Encounter Provider: Janeth Dinero PA-C  Encounter Date: 2025   Encounter department: [facility] [place]  :  Assessment & Plan  Family history of inflammatory bowel disease    Orders:    Ambulatory Referral to Gastroenterology    MRI enterography w wo; Future    Elevated fecal calprotectin  - She presents due to 2 years of change in bowel habits with constipation associated with bloating, early satiety, fullness, with a colonoscopy in 2024 in [place] showing dimunitive hyperplastic polyps in the rectosigmoid and melanosis coli but no evidence of colitis or terminal ileitis   - she did GI mapping which showed an elevated fecal calprotectin >3000  - She had a negative CTA abdomen in 2024  - Will plan for MR enterography to evaluation for small bowel pathology such as small bowel Crohn's  - Plan for EGD to rule out PUD, H. pylori, and celiac disease  Orders:    EGD; Future    MRI enterography w wo; Future    Change in bowel habits    Orders:    EGD; Future    Chronic constipation    Orders:    MRI enterography w wo; Future    Epigastric pain  - EGD as above due to her epigastric discomfort with fullness and early satiety at times  Orders:    EGD; Future    Gastroesophageal reflux disease, unspecified whether esophagitis present    Orders:    EGD; Future        History of Present Illness   HPI  Aleta Griffith is a 29 y.o. female who presents for evaluation primarily of constipation and abnormalities in a GI mapping test she paid for.  She reports that typically at baseline she would have a bowel movement every 2 to 3 days and that was her normal and then about 2 years ago she started to have problems with constipation not going up to 7 to 10 days at a time with associated bloating and discomfort.  She had actually had a colonoscopy done in February of last year in [place] she was living  there at the time and they evaluated 5 cm of the terminal ileum which appeared normal and then she was found to have multiple diminutive polyps in the rectosigmoid area, 5 of which were removed and sampled.  Random biopsies were taken throughout the colon.  There is no evidence of colitis or terminal ileitis and the polyps were hyperplastic.  She then had this GI mapping done as recommended through her  as she is a  and she started doing this 2 to 3 years ago.  She is wondering if she could have celiac disease.  She is wondering what could be causing her to be so constipated.  She does not really get any nausea or vomiting but will feel full and like food is just sitting there.  She did get reflux over the past week but this might be related to a berberine supplement she started taking as she has never struggled with chronic reflux before.  She is never had an upper endoscopy in the past.  She does report a family history of maybe inflammatory bowel disease in her brother who told her that he had something similar to her Crohn's disease though does not sound like ulcerative colitis when she described as he is primarily constipated as well.  History obtained from: patient    Review of Systems  Medical History Reviewed by provider this encounter:  Tobacco  Allergies  Meds  Problems  Med Hx  Surg Hx  Fam Hx     .  Current Outpatient Medications on File Prior to Visit   Medication Sig Dispense Refill    Acetylcysteine (N-Acetyl Cysteine) 600 MG CAPS Take 600 mg by mouth 2 (two) times a day      BERBERINE CHLORIDE PO Take 400 mg by mouth daily      Bioflavonoid Products (GRAPE SEED EXTRACT PO) Take 125 mg by mouth daily      Delia, Zingiber officinalis, (DELIA ROOT PO) Take 2,200 mg by mouth 4 times a day      MAGNESIUM GLUCONATE PO Take by mouth      Probiotic Product (PROBIOTIC BLEND PO) Take by mouth      saccharomyces boulardii (FLORASTOR) 250 mg capsule Take 250 mg by mouth 2 (two) times a  "day       No current facility-administered medications on file prior to visit.         Objective   /66 (BP Location: Left arm, Patient Position: Sitting, Cuff Size: Standard)   Pulse 74   Temp 97.8 °F (36.6 °C) (Tympanic)   Ht 5' 4\" (1.626 m)   Wt 65.3 kg (144 lb)   LMP 12/19/2024 (Exact Date)   SpO2 98%   BMI 24.72 kg/m²      Physical Exam  General- Appears well, no acute distress  CV- RRR, no murmur  Pulm- CTA  bilaterally, no respiratory distress  Abdomen- Non-distended, BS normoactive, NTTP  "

## 2025-02-04 ENCOUNTER — ANESTHESIA EVENT (OUTPATIENT)
Dept: GASTROENTEROLOGY | Facility: HOSPITAL | Age: 30
End: 2025-02-04
Payer: COMMERCIAL

## 2025-02-04 ENCOUNTER — HOSPITAL ENCOUNTER (OUTPATIENT)
Dept: GASTROENTEROLOGY | Facility: HOSPITAL | Age: 30
Setting detail: OUTPATIENT SURGERY
Discharge: HOME | End: 2025-02-04
Payer: COMMERCIAL

## 2025-02-04 ENCOUNTER — ANESTHESIA (OUTPATIENT)
Dept: GASTROENTEROLOGY | Facility: HOSPITAL | Age: 30
End: 2025-02-04
Payer: COMMERCIAL

## 2025-02-04 VITALS
SYSTOLIC BLOOD PRESSURE: 103 MMHG | HEIGHT: 64 IN | WEIGHT: 152.56 LBS | OXYGEN SATURATION: 100 % | TEMPERATURE: 97 F | DIASTOLIC BLOOD PRESSURE: 66 MMHG | HEART RATE: 56 BPM | BODY MASS INDEX: 26.05 KG/M2 | RESPIRATION RATE: 12 BRPM

## 2025-02-04 DIAGNOSIS — R19.4 CHANGE IN BOWEL HABITS: ICD-10-CM

## 2025-02-04 DIAGNOSIS — K21.9 GASTROESOPHAGEAL REFLUX DISEASE, UNSPECIFIED WHETHER ESOPHAGITIS PRESENT: ICD-10-CM

## 2025-02-04 DIAGNOSIS — R10.13 EPIGASTRIC PAIN: ICD-10-CM

## 2025-02-04 DIAGNOSIS — R19.5 ELEVATED FECAL CALPROTECTIN: ICD-10-CM

## 2025-02-04 LAB
EXT PREGNANCY TEST URINE: NEGATIVE
EXT. CONTROL: NORMAL

## 2025-02-04 PROCEDURE — 43239 EGD BIOPSY SINGLE/MULTIPLE: CPT | Performed by: INTERNAL MEDICINE

## 2025-02-04 PROCEDURE — 88305 TISSUE EXAM BY PATHOLOGIST: CPT | Performed by: PATHOLOGY

## 2025-02-04 PROCEDURE — 81025 URINE PREGNANCY TEST: CPT | Performed by: INTERNAL MEDICINE

## 2025-02-04 RX ORDER — SODIUM CHLORIDE, SODIUM LACTATE, POTASSIUM CHLORIDE, CALCIUM CHLORIDE 600; 310; 30; 20 MG/100ML; MG/100ML; MG/100ML; MG/100ML
INJECTION, SOLUTION INTRAVENOUS CONTINUOUS PRN
Status: DISCONTINUED | OUTPATIENT
Start: 2025-02-04 | End: 2025-02-04

## 2025-02-04 RX ORDER — LIDOCAINE HYDROCHLORIDE 20 MG/ML
INJECTION, SOLUTION EPIDURAL; INFILTRATION; INTRACAUDAL; PERINEURAL AS NEEDED
Status: DISCONTINUED | OUTPATIENT
Start: 2025-02-04 | End: 2025-02-04

## 2025-02-04 RX ORDER — PROPOFOL 10 MG/ML
INJECTION, EMULSION INTRAVENOUS AS NEEDED
Status: DISCONTINUED | OUTPATIENT
Start: 2025-02-04 | End: 2025-02-04

## 2025-02-04 RX ADMIN — PROPOFOL 200 MG: 10 INJECTION, EMULSION INTRAVENOUS at 10:35

## 2025-02-04 RX ADMIN — SODIUM CHLORIDE, SODIUM LACTATE, POTASSIUM CHLORIDE, AND CALCIUM CHLORIDE: .6; .31; .03; .02 INJECTION, SOLUTION INTRAVENOUS at 09:58

## 2025-02-04 RX ADMIN — LIDOCAINE HYDROCHLORIDE 80 MG: 20 INJECTION, SOLUTION EPIDURAL; INFILTRATION; INTRACAUDAL; PERINEURAL at 10:35

## 2025-02-04 NOTE — INTERVAL H&P NOTE
H&P reviewed. After examining the patient I find no changes in the patients condition since the H&P had been written.    Vitals:    02/04/25 0927   BP: 108/62   Pulse: 62   Resp: 18   Temp: 98.5 °F (36.9 °C)   SpO2: 100%

## 2025-02-04 NOTE — ANESTHESIA PREPROCEDURE EVALUATION
Procedure:  EGD    Relevant Problems   No relevant active problems        Physical Exam    Airway    Mallampati score: II  TM Distance: >3 FB  Neck ROM: full     Dental   No notable dental hx     Cardiovascular  Rhythm: regular, No weak pulses    Pulmonary   No stridor    Other Findings  post-pubertal.      Anesthesia Plan  ASA Score- 2     Anesthesia Type- IV sedation with anesthesia with ASA Monitors.         Additional Monitors:     Airway Plan:            Plan Factors-    Chart reviewed.   Existing labs reviewed. Patient summary reviewed.                  Induction- intravenous.    Postoperative Plan-         Informed Consent- Anesthetic plan and risks discussed with patient.  I personally reviewed this patient with the CRNA. Discussed and agreed on the Anesthesia Plan with the CRNA..      NPO Status:  Vitals Value Taken Time   Date of last liquid 02/03/25 02/04/25 0907   Time of last liquid 2359 02/04/25 0907   Date of last solid 02/03/25 02/04/25 0907   Time of last solid 2359 02/04/25 0907

## 2025-02-04 NOTE — ANESTHESIA POSTPROCEDURE EVALUATION
Post-Op Assessment Note    CV Status:  Stable    Pain management: adequate       Mental Status:  Sleepy and arousable   Hydration Status:  Euvolemic   PONV Controlled:  Controlled   Airway Patency:  Patent     Post Op Vitals Reviewed: Yes    No anethesia notable event occurred.    Staff: CRNA           Last Filed PACU Vitals:  Vitals Value Taken Time   Temp 97 °F (36.1 °C) 02/04/25 1044   Pulse 59 02/04/25 1054   BP 96/57 02/04/25 1054   Resp 22 02/04/25 1054   SpO2 99 % 02/04/25 1054       Modified Joey:     Vitals Value Taken Time   Activity 2 02/04/25 1054   Respiration 2 02/04/25 1054   Circulation 2 02/04/25 1054   Consciousness 1 02/04/25 1054   Oxygen Saturation 2 02/04/25 1054     Modified Joey Score: 9

## 2025-02-06 ENCOUNTER — RESULTS FOLLOW-UP (OUTPATIENT)
Dept: GASTROENTEROLOGY | Facility: CLINIC | Age: 30
End: 2025-02-06

## 2025-02-06 PROCEDURE — 88305 TISSUE EXAM BY PATHOLOGIST: CPT | Performed by: PATHOLOGY

## 2025-02-10 ENCOUNTER — HOSPITAL ENCOUNTER (EMERGENCY)
Facility: HOSPITAL | Age: 30
Discharge: HOME | End: 2025-02-11
Attending: EMERGENCY MEDICINE | Admitting: EMERGENCY MEDICINE
Payer: COMMERCIAL

## 2025-02-10 VITALS
TEMPERATURE: 98.4 F | HEART RATE: 82 BPM | BODY MASS INDEX: 24.24 KG/M2 | WEIGHT: 142 LBS | SYSTOLIC BLOOD PRESSURE: 120 MMHG | HEIGHT: 64 IN | OXYGEN SATURATION: 100 % | DIASTOLIC BLOOD PRESSURE: 70 MMHG | RESPIRATION RATE: 18 BRPM

## 2025-02-10 DIAGNOSIS — J10.1 INFLUENZA A: Primary | ICD-10-CM

## 2025-02-10 LAB
EXT PREGNANCY TEST URINE: NEGATIVE
EXT. CONTROL: NORMAL
FLUAV AG UPPER RESP QL IA.RAPID: POSITIVE
FLUBV AG UPPER RESP QL IA.RAPID: NEGATIVE
SARS-COV+SARS-COV-2 AG RESP QL IA.RAPID: NEGATIVE

## 2025-02-10 PROCEDURE — 96372 THER/PROPH/DIAG INJ SC/IM: CPT

## 2025-02-10 PROCEDURE — 81025 URINE PREGNANCY TEST: CPT

## 2025-02-10 PROCEDURE — 87804 INFLUENZA ASSAY W/OPTIC: CPT

## 2025-02-10 PROCEDURE — 99284 EMERGENCY DEPT VISIT MOD MDM: CPT

## 2025-02-10 PROCEDURE — 99283 EMERGENCY DEPT VISIT LOW MDM: CPT

## 2025-02-10 PROCEDURE — 87811 SARS-COV-2 COVID19 W/OPTIC: CPT

## 2025-02-10 RX ORDER — KETOROLAC TROMETHAMINE 30 MG/ML
15 INJECTION, SOLUTION INTRAMUSCULAR; INTRAVENOUS ONCE
Status: COMPLETED | OUTPATIENT
Start: 2025-02-10 | End: 2025-02-10

## 2025-02-10 RX ORDER — IBUPROFEN 600 MG/1
600 TABLET, FILM COATED ORAL EVERY 6 HOURS PRN
Qty: 30 TABLET | Refills: 0 | Status: SHIPPED | OUTPATIENT
Start: 2025-02-10

## 2025-02-10 RX ORDER — OSELTAMIVIR PHOSPHATE 75 MG/1
75 CAPSULE ORAL EVERY 12 HOURS
Qty: 10 CAPSULE | Refills: 0 | Status: SHIPPED | OUTPATIENT
Start: 2025-02-10 | End: 2025-02-15

## 2025-02-10 RX ORDER — ACETAMINOPHEN 325 MG/1
975 TABLET ORAL ONCE
Status: COMPLETED | OUTPATIENT
Start: 2025-02-10 | End: 2025-02-10

## 2025-02-10 RX ORDER — ACETAMINOPHEN 500 MG
1000 TABLET ORAL EVERY 6 HOURS PRN
Qty: 30 TABLET | Refills: 0 | Status: SHIPPED | OUTPATIENT
Start: 2025-02-10

## 2025-02-10 RX ADMIN — ACETAMINOPHEN 975 MG: 325 TABLET, FILM COATED ORAL at 23:32

## 2025-02-10 RX ADMIN — KETOROLAC TROMETHAMINE 15 MG: 30 INJECTION, SOLUTION INTRAMUSCULAR; INTRAVENOUS at 23:30

## 2025-02-10 NOTE — Clinical Note
Aleta Griffith was seen and treated in our emergency department on 2/10/2025.                Diagnosis: Influenza A    Aleta  may return to work on return date.    She may return on this date: 02/12/2025         If you have any questions or concerns, please don't hesitate to call.      Lopez Rosario MD    ______________________________           _______________          _______________  Hospital Representative                              Date                                Time

## 2025-02-10 NOTE — Clinical Note
Aleta Griffith was seen and treated in our emergency department on 2/10/2025.                Diagnosis: Influenza A    Aleta  may return to work on return date.    She may return on this date: 02/12/2025         If you have any questions or concerns, please don't hesitate to call.      Poncho Caraballo PA-C    ______________________________           _______________          _______________  Hospital Representative                              Date                                Time

## 2025-02-11 NOTE — DISCHARGE INSTRUCTIONS
Take medication as prescribed  Follow-up with PCP in outpatient setting  Return emergency room for increasing fever, tach, chills, productive cough, increased wheezing, worsening lethargy or fatigue, poor intractable nausea and vomiting.

## 2025-02-11 NOTE — ED PROVIDER NOTES
Time reflects when diagnosis was documented in both MDM as applicable and the Disposition within this note       Time User Action Codes Description Comment    2/10/2025 11:17 PM Poncho Caraballo Add [J10.1] Influenza A           ED Disposition       ED Disposition   Discharge    Condition   Stable    Date/Time   Mon Feb 10, 2025 11:17 PM    Comment   Aleta Griffith discharge to home/self care.                   Assessment & Plan       Medical Decision Making  Patient is a 29-year-old female presenting to the emergency department with fever, body aches, chills, nasal congestion, cough and cough x 1 day.  Physical exam shows bilateral nasal congestion with scant rhinorrhea, posterior oropharyngeal erythema noted without any tonsillar swelling or exudates.  Lungs clear auscultation bilaterally without any abdominal tenderness. DDx including but not limited to: viral illness, pneumonia, bronchiolitis, URI, OM, pharyngitis, influenza, RSV, COVID-19 (novel coronavirus).  Patient tested for COVID and flu.  Patient is positive for influenza A.  Due to patient symptoms only starting 1 day ago.  Patient started on oseltamavir, Tylenol, and Motrin.  Pregnancy test negative at this time.  Discussed with patient increased risk in oral hydration with water and electrolyte solutions such as Pedialyte or Gatorade.  Discussed with patient to increase bed rest the next couple of days.  Patient with strict return precautions to return to the emergency room for increasing fever, tach, chills, productive cough, increased wheezing, worsening lethargy or fatigue, poor intractable nausea and vomiting.  Patient verbalized understanding and agrees to current plan.  Patient discharged home in stable condition at this time    Amount and/or Complexity of Data Reviewed  Labs: ordered.    Risk  OTC drugs.  Prescription drug management.             Medications   acetaminophen (TYLENOL) tablet 975 mg (975 mg Oral Given 2/10/25 2185)   ketorolac  (TORADOL) injection 15 mg (15 mg Intramuscular Given 2/10/25 5390)       ED Risk Strat Scores                          SBIRT 20yo+      Flowsheet Row Most Recent Value   Initial Alcohol Screen: US AUDIT-C     1. How often do you have a drink containing alcohol? 0 Filed at: 02/10/2025 2234   2. How many drinks containing alcohol do you have on a typical day you are drinking?  0 Filed at: 02/10/2025 2234   3b. FEMALE Any Age, or MALE 65+: How often do you have 4 or more drinks on one occassion? 0 Filed at: 02/10/2025 2234   Audit-C Score 0 Filed at: 02/10/2025 2234   GEN: How many times in the past year have you...    Used an illegal drug or used a prescription medication for non-medical reasons? Never Filed at: 02/10/2025 2234                            History of Present Illness       Chief Complaint   Patient presents with    Flu Symptoms     Pt arrives c/o fever, and body aches x 1 day. Pt reports sick contact.        Past Medical History:   Diagnosis Date    Ovarian hyperstimulation syndrome     Polycystic ovary syndrome       Past Surgical History:   Procedure Laterality Date    AUGMENTATION BREAST Bilateral 01/2024    BREAST SURGERY  Jan 25, 2024      Family History   Problem Relation Age of Onset    Diabetes Maternal Grandmother     Diabetes Maternal Grandfather       Social History     Tobacco Use    Smoking status: Never    Smokeless tobacco: Never   Vaping Use    Vaping status: Never Used   Substance Use Topics    Alcohol use: Yes     Alcohol/week: 3.0 standard drinks of alcohol     Types: 3 Standard drinks or equivalent per week    Drug use: Yes     Frequency: 1.0 times per week     Types: Marijuana     Comment: occasionally.      E-Cigarette/Vaping    E-Cigarette Use Never User       E-Cigarette/Vaping Substances    Nicotine No     THC No     CBD No     Flavoring No     Other No     Unknown No       I have reviewed and agree with the history as documented.     Patient is a 29-year-old female presenting  to the emergency department with fever, body aches, chills, nasal congestion, cough and cough x 1 day.  Patient reports multiple sick contacts at work.  Patient states that she has been taking Tylenol with minimal relief.  Patient denies any productive cough, abdominal pain, vomiting, diarrhea, or any  symptoms at this time.      Flu Symptoms  Presenting symptoms: fatigue, fever, nausea, rhinorrhea and sore throat    Presenting symptoms: no cough, no diarrhea, no headaches, no shortness of breath and no vomiting    Associated symptoms: nasal congestion    Associated symptoms: no chills and no ear pain        Review of Systems   Constitutional:  Positive for fatigue and fever. Negative for chills and diaphoresis.   HENT:  Positive for congestion, rhinorrhea and sore throat. Negative for ear pain, facial swelling, nosebleeds and sneezing.    Eyes:  Negative for pain, discharge, itching and visual disturbance.   Respiratory:  Negative for cough, choking, chest tightness, shortness of breath, wheezing and stridor.    Cardiovascular:  Negative for chest pain and palpitations.   Gastrointestinal:  Positive for nausea. Negative for abdominal pain, constipation, diarrhea and vomiting.   Genitourinary:  Negative for difficulty urinating, dysuria, flank pain, frequency, hematuria, vaginal discharge and vaginal pain.   Musculoskeletal:  Negative for arthralgias and back pain.   Skin:  Negative for color change and rash.   Neurological:  Negative for dizziness, seizures, syncope, weakness, light-headedness and headaches.   All other systems reviewed and are negative.          Objective       ED Triage Vitals   Temperature Pulse Blood Pressure Respirations SpO2 Patient Position - Orthostatic VS   02/10/25 2231 02/10/25 2231 02/10/25 2231 02/10/25 2231 02/10/25 2231 02/10/25 2231   98.4 °F (36.9 °C) 83 124/78 18 100 % Sitting      Temp Source Heart Rate Source BP Location FiO2 (%) Pain Score    02/10/25 2231 02/10/25 2231  02/10/25 2231 -- 02/10/25 2330    Oral Monitor Left arm  6      Vitals      Date and Time Temp Pulse SpO2 Resp BP Pain Score FACES Pain Rating User   02/10/25 2330 -- -- -- -- -- 6 -- KM   02/10/25 2231 98.4 °F (36.9 °C) 83 100 % 18 124/78 -- -- RO            Physical Exam  Vitals and nursing note reviewed.   Constitutional:       General: She is not in acute distress.     Appearance: Normal appearance. She is well-developed. She is ill-appearing. She is not toxic-appearing or diaphoretic.   HENT:      Head: Normocephalic and atraumatic.      Right Ear: Tympanic membrane, ear canal and external ear normal.      Left Ear: Tympanic membrane, ear canal and external ear normal.      Nose: Congestion and rhinorrhea present.      Mouth/Throat:      Mouth: Mucous membranes are moist.      Pharynx: Oropharynx is clear. Posterior oropharyngeal erythema present. No oropharyngeal exudate.   Eyes:      General:         Right eye: No discharge.         Left eye: No discharge.      Extraocular Movements: Extraocular movements intact.      Conjunctiva/sclera: Conjunctivae normal.      Pupils: Pupils are equal, round, and reactive to light.   Cardiovascular:      Rate and Rhythm: Normal rate and regular rhythm.      Pulses: Normal pulses.      Heart sounds: Normal heart sounds. No murmur heard.     No friction rub. No gallop.   Pulmonary:      Effort: Pulmonary effort is normal. No respiratory distress.      Breath sounds: Normal breath sounds. No stridor. No wheezing, rhonchi or rales.   Chest:      Chest wall: No tenderness.   Abdominal:      General: Abdomen is flat. There is no distension.      Palpations: Abdomen is soft.      Tenderness: There is no abdominal tenderness. There is no right CVA tenderness, left CVA tenderness or guarding.   Musculoskeletal:         General: No swelling or tenderness.      Cervical back: Normal range of motion and neck supple. No rigidity or tenderness.   Lymphadenopathy:      Cervical: No  cervical adenopathy.   Skin:     General: Skin is warm and dry.      Capillary Refill: Capillary refill takes less than 2 seconds.      Coloration: Skin is not jaundiced or pale.      Findings: No bruising, erythema, lesion or rash.   Neurological:      General: No focal deficit present.      Mental Status: She is alert and oriented to person, place, and time.      Cranial Nerves: No cranial nerve deficit.      Sensory: No sensory deficit.      Motor: No weakness.   Psychiatric:         Mood and Affect: Mood normal.         Results Reviewed       Procedure Component Value Units Date/Time    POCT pregnancy, urine [[id number]]  (Normal) Collected: 02/10/25 2331    Lab Status: Final result Updated: 02/10/25 2336     EXT Preg Test, Ur Negative     Control Valid    FLU/COVID Rapid Antigen (30 min. TAT) - Preferred screening test in ED [[id number]]  (Abnormal) Collected: 02/10/25 2234    Lab Status: Final result Specimen: Nares from Nose Updated: 02/10/25 2301     SARS COV Rapid Antigen Negative     Influenza A Rapid Antigen Positive     Influenza B Rapid Antigen Negative    Narrative:      This test has been performed using the Quidel Flower 2 FLU+SARS Antigen test under the Emergency Use Authorization (EUA). This test has been validated by the  and verified by the performing laboratory. The Flower uses lateral flow immunofluorescent sandwich assay to detect SARS-COV, Influenza A and Influenza B Antigen.     The Quidel Flower 2 SARS Antigen test does not differentiate between SARS-CoV and SARS-CoV-2.     Negative results are presumptive and may be confirmed with a molecular assay, if necessary, for patient management. Negative results do not rule out SARS-CoV-2 or influenza infection and should not be used as the sole basis for treatment or patient management decisions. A negative test result may occur if the level of antigen in a sample is below the limit of detection of this test.     Positive results are  indicative of the presence of viral antigens, but do not rule out bacterial infection or co-infection with other viruses.     All test results should be used as an adjunct to clinical observations and other information available to the provider.    FOR PEDIATRIC PATIENTS - copy/paste COVID Guidelines URL to browser: [email or web address]            No orders to display       Procedures    ED Medication and Procedure Management   Prior to Admission Medications   Prescriptions Last Dose Informant Patient Reported? Taking?   Acetylcysteine (N-Acetyl Cysteine) 600 MG CAPS   Yes No   Sig: Take 600 mg by mouth 2 (two) times a day   BERBERINE CHLORIDE PO   Yes No   Sig: Take 400 mg by mouth daily   Bioflavonoid Products (GRAPE SEED EXTRACT PO)   Yes No   Sig: Take 125 mg by mouth daily   Ginger, Zingiber officinalis, (GINGER ROOT PO)   Yes No   Sig: Take 2,200 mg by mouth 4 times a day   MAGNESIUM GLUCONATE PO   Yes No   Sig: Take by mouth   Probiotic Product (PROBIOTIC BLEND PO)   Yes No   Sig: Take by mouth   saccharomyces boulardii (FLORASTOR) 250 mg capsule   Yes No   Sig: Take 250 mg by mouth 2 (two) times a day      Facility-Administered Medications: None     Patient's Medications   Discharge Prescriptions    ACETAMINOPHEN (TYLENOL) 500 MG TABLET    Take 2 tablets (1,000 mg total) by mouth every 6 (six) hours as needed for mild pain or fever       Start Date: 2/10/2025 End Date: --       Order Dose: 1,000 mg       Quantity: 30 tablet    Refills: 0    IBUPROFEN (MOTRIN) 600 MG TABLET    Take 1 tablet (600 mg total) by mouth every 6 (six) hours as needed for mild pain       Start Date: 2/10/2025 End Date: --       Order Dose: 600 mg       Quantity: 30 tablet    Refills: 0    OSELTAMIVIR (TAMIFLU) 75 MG CAPSULE    Take 1 capsule (75 mg total) by mouth every 12 (twelve) hours for 5 days       Start Date: 2/10/2025 End Date: 2/15/2025       Order Dose: 75 mg        Quantity: 10 capsule    Refills: 0     No discharge procedures on file.  ED SEPSIS DOCUMENTATION   Time reflects when diagnosis was documented in both MDM as applicable and the Disposition within this note       Time User Action Codes Description Comment    2/10/2025 11:17 PM Poncho Caraballo Add [J10.1] Influenza A                  Poncoh Caraballo PA-C  02/10/25 3641

## 2025-02-17 ENCOUNTER — HOSPITAL ENCOUNTER (OUTPATIENT)
Dept: MRI IMAGING | Facility: HOSPITAL | Age: 30
Discharge: HOME | End: 2025-02-17

## 2025-02-17 DIAGNOSIS — K59.09 CHRONIC CONSTIPATION: ICD-10-CM

## 2025-02-17 DIAGNOSIS — R19.5 ELEVATED FECAL CALPROTECTIN: ICD-10-CM

## 2025-02-17 DIAGNOSIS — Z83.79 FAMILY HISTORY OF INFLAMMATORY BOWEL DISEASE: ICD-10-CM

## 2025-02-27 ENCOUNTER — OFFICE VISIT (OUTPATIENT)
Dept: OBGYN CLINIC | Facility: CLINIC | Age: 30
End: 2025-02-27
Payer: COMMERCIAL

## 2025-02-27 VITALS
SYSTOLIC BLOOD PRESSURE: 98 MMHG | WEIGHT: 157 LBS | HEIGHT: 64 IN | DIASTOLIC BLOOD PRESSURE: 70 MMHG | BODY MASS INDEX: 26.8 KG/M2

## 2025-02-27 DIAGNOSIS — E28.2 PCOS (POLYCYSTIC OVARIAN SYNDROME): ICD-10-CM

## 2025-02-27 DIAGNOSIS — Z30.017 NEXPLANON INSERTION: Primary | ICD-10-CM

## 2025-02-27 LAB — SL AMB POCT URINE HCG: NEGATIVE

## 2025-02-27 PROCEDURE — 11981 INSERTION DRUG DLVR IMPLANT: CPT | Performed by: PHYSICIAN ASSISTANT

## 2025-02-27 PROCEDURE — 81025 URINE PREGNANCY TEST: CPT | Performed by: PHYSICIAN ASSISTANT

## 2025-02-27 NOTE — PROGRESS NOTES
Universal Protocol:  procedure performed by consultantConsent: Verbal consent obtained. Written consent obtained.  Risks and benefits: risks, benefits and alternatives were discussed  Consent given by: patient  Patient understanding: patient states understanding of the procedure being performed  Patient consent: the patient's understanding of the procedure matches consent given  Procedure consent: procedure consent matches procedure scheduled  Required items: required blood products, implants, devices, and special equipment available  Patient identity confirmed: verbally with patient  Remove and insert drug implant    Date/Time: 2/27/2025 2:30 PM    Performed by: Sena Florentino PA-C  Authorized by: Sena Florentino PA-C    Indication:     Indication: Insertion of non-biodegradable drug delivery implant    Pre-procedure:     Prepped with: alcohol 70% and povidone-iodine      Local anesthetic:  Lidocaine with epinephrine    The site was cleaned and prepped in a sterile fashion: yes    Procedure:     Procedure:  Insertion    Small stab incision was made in arm: yes      Left/right:  Left    Preloaded contraceptive capsule trocar was placed subdermally: yes      Visualization of implant was obtained: yes      Contraceptive capsule was inserted and trocar removed: yes      Visualization of notch in stylet and palpation of device: yes      Palpation confirms placement by provider and patient: yes      Site was closed with steri-strips and pressure bandage applied: yes    Comments:      Patient presented to the office for Nexplanon insertion for contraception. She had unprotected intercourse 13 days ago. UPT is negative today. We discussed that patient will need to take UPT tomorrow and again in 1 week. If positive, will need to rto asap for nexplanon removal. She expresses understanding and agrees.. She is feeling well today. She tolerated the procedure well and without complication. Patient and provider palpated the device  after the procedure. The area was closed using steri strips and a pressure bandage was applied. Patient was instructed to leave the pressure dressing in place for 24 hours and keep a covering over the site for 7 days. She was instructed to use backup protection for at least 4 weeks following insertion to prevent pregnancy and should continue to use condoms to prevent STI. Patient to call with any questions.

## 2025-03-06 ENCOUNTER — HOSPITAL ENCOUNTER (OUTPATIENT)
Dept: MRI IMAGING | Facility: HOSPITAL | Age: 30
End: 2025-03-06
Payer: COMMERCIAL

## 2025-03-06 DIAGNOSIS — K59.09 CHRONIC CONSTIPATION: ICD-10-CM

## 2025-03-06 DIAGNOSIS — R19.5 ELEVATED FECAL CALPROTECTIN: ICD-10-CM

## 2025-03-06 DIAGNOSIS — Z83.79 FAMILY HISTORY OF INFLAMMATORY BOWEL DISEASE: ICD-10-CM

## 2025-03-06 PROCEDURE — A9585 GADOBUTROL INJECTION: HCPCS | Performed by: PHYSICIAN ASSISTANT

## 2025-03-06 PROCEDURE — 74183 MRI ABD W/O CNTR FLWD CNTR: CPT

## 2025-03-06 PROCEDURE — 72197 MRI PELVIS W/O & W/DYE: CPT

## 2025-03-06 RX ORDER — GADOBUTROL 604.72 MG/ML
5 INJECTION INTRAVENOUS
Status: COMPLETED | OUTPATIENT
Start: 2025-03-06 | End: 2025-03-06

## 2025-03-06 RX ADMIN — GADOBUTROL 5 ML: 604.72 INJECTION INTRAVENOUS at 09:57

## 2025-03-10 ENCOUNTER — RESULTS FOLLOW-UP (OUTPATIENT)
Dept: GASTROENTEROLOGY | Facility: CLINIC | Age: 30
End: 2025-03-10

## 2025-03-19 ENCOUNTER — VBI (OUTPATIENT)
Dept: ADMINISTRATIVE | Facility: OTHER | Age: 30
End: 2025-03-19

## 2025-03-19 NOTE — TELEPHONE ENCOUNTER
03/19/25 8:49 AM     Chart reviewed for Pap Smear (HPV) aka Cervical Cancer Screening ; nothing is submitted to the patient's insurance at this time.     Pavithra Brown   [facility]

## 2025-05-09 ENCOUNTER — OFFICE VISIT (OUTPATIENT)
Dept: FAMILY MEDICINE CLINIC | Facility: CLINIC | Age: 30
End: 2025-05-09
Payer: COMMERCIAL

## 2025-05-09 VITALS
DIASTOLIC BLOOD PRESSURE: 62 MMHG | HEART RATE: 70 BPM | WEIGHT: 148.6 LBS | OXYGEN SATURATION: 98 % | BODY MASS INDEX: 25.37 KG/M2 | HEIGHT: 64 IN | SYSTOLIC BLOOD PRESSURE: 104 MMHG

## 2025-05-09 DIAGNOSIS — R73.03 PREDIABETES: ICD-10-CM

## 2025-05-09 DIAGNOSIS — E28.2 PCOS (POLYCYSTIC OVARIAN SYNDROME): Primary | ICD-10-CM

## 2025-05-09 LAB — SL AMB POCT HEMOGLOBIN AIC: 5.2 (ref ?–6.5)

## 2025-05-09 PROCEDURE — 99214 OFFICE O/P EST MOD 30 MIN: CPT | Performed by: FAMILY MEDICINE

## 2025-05-09 PROCEDURE — 83036 HEMOGLOBIN GLYCOSYLATED A1C: CPT | Performed by: FAMILY MEDICINE

## 2025-05-09 NOTE — PROGRESS NOTES
"Name: Aleta Griffith      : 1995      MRN: [id number]  Encounter Provider: Cecelia Fernandez DO  Encounter Date: 2025   Encounter department: [facility] [address]  :  Assessment & Plan  Prediabetes  Hgb A1c of 5.2  Orders:  •  POCT hemoglobin A1c    PCOS (polycystic ovarian syndrome)    Orders:  •  POCT hemoglobin A1c           History of Present Illness   HPI    Patient presents to the office for follow up.     Notes that she is on the Nexplanon since  2025, has had some spotting since. Denies any pain or discomfort.    Has been taking the berberine daily to help with her blood glucose.     Diet wise she is very healthy, carbs are about 170 g daily and Protein 140 g daily. She is a  and she is in prep time, competition is end of August.     Review of Systems    Objective   /62   Pulse 70   Ht 5' 4\" (1.626 m)   Wt 67.4 kg (148 lb 9.6 oz)   SpO2 98%   BMI 25.51 kg/m²      Physical Exam  Vitals reviewed.   Constitutional:       General: She is not in acute distress.     Appearance: Normal appearance.   HENT:      Head: Normocephalic and atraumatic.      Right Ear: External ear normal.      Left Ear: External ear normal.      Nose: Nose normal.      Mouth/Throat:      Mouth: Mucous membranes are moist.   Eyes:      Extraocular Movements: Extraocular movements intact.      Conjunctiva/sclera: Conjunctivae normal.      Pupils: Pupils are equal, round, and reactive to light.   Cardiovascular:      Rate and Rhythm: Normal rate and regular rhythm.      Heart sounds: Normal heart sounds.   Pulmonary:      Effort: Pulmonary effort is normal.      Breath sounds: Normal breath sounds.   Abdominal:      General: Bowel sounds are normal. There is no distension.      Palpations: Abdomen is soft.      Tenderness: There is no abdominal tenderness.   Musculoskeletal:      Cervical back: Neck supple.      Right lower leg: No edema.      Left lower leg: No " edema.   Lymphadenopathy:      Cervical: No cervical adenopathy.   Skin:     General: Skin is warm.      Capillary Refill: Capillary refill takes less than 2 seconds.      Findings: No rash.   Neurological:      Mental Status: She is alert. Mental status is at baseline.           Cecelia Fernandez DO  [facility]  5/9/2025 10:40 AM

## 2025-08-15 ENCOUNTER — OFFICE VISIT (OUTPATIENT)
Dept: FAMILY MEDICINE CLINIC | Facility: CLINIC | Age: 30
End: 2025-08-15
Payer: COMMERCIAL